# Patient Record
Sex: MALE | Race: WHITE
[De-identification: names, ages, dates, MRNs, and addresses within clinical notes are randomized per-mention and may not be internally consistent; named-entity substitution may affect disease eponyms.]

---

## 2018-05-14 ENCOUNTER — HOSPITAL ENCOUNTER (OUTPATIENT)
Dept: HOSPITAL 33 - SDC | Age: 48
Discharge: HOME | End: 2018-05-14
Attending: SURGERY
Payer: COMMERCIAL

## 2018-05-14 VITALS — SYSTOLIC BLOOD PRESSURE: 128 MMHG | HEART RATE: 76 BPM | DIASTOLIC BLOOD PRESSURE: 70 MMHG

## 2018-05-14 VITALS — OXYGEN SATURATION: 97 %

## 2018-05-14 DIAGNOSIS — K63.9: ICD-10-CM

## 2018-05-14 DIAGNOSIS — Q43.8: ICD-10-CM

## 2018-05-14 DIAGNOSIS — Z12.11: Primary | ICD-10-CM

## 2018-05-14 DIAGNOSIS — K63.5: ICD-10-CM

## 2018-05-14 PROCEDURE — 88305 TISSUE EXAM BY PATHOLOGIST: CPT

## 2018-05-14 NOTE — HP
DATE OF SURGERY:  05/14/2018



HISTORY OF PRESENT ILLNESS:  The patient is a 47 year-old going to be 48 this year with 
history of diverticulitis episode a few months ago and no prior colonoscopy. No bloody 
stools. No change in bowel movements. No family history of colon cancer. He is in need of 
screening colonoscopy as he turns 48 this year. 



PAST MEDICAL HISTORY:  Hypertension/



PAST SURGICAL HISTORY:  None.



MEDICATIONS:  He takes blood pressure pill the name of which he did not know at the time 
of the office visit. 



ALLERGIES:  NKDA.

 

FAMILY HISTORY:  Negative for colon cancer. 



SOCIAL HISTORY:  He denies smoking currently. 



REVIEW OF SYSTEMS:  Twelve systems reviewed per admission assessment. No chest pain or 
palpitations other systems negative or noncontributory as above and per preadmission 
questionnaire. 



PHYSICAL EXAMINATION:  

GENERAL:  No acute distress.

HEENT: Sclerae nonicteric.

NECK:  No JVD.

CHEST: Equal excursion, nonlabored breathing. 

CVS:  Regular rate and rhythm. 

ABDOMEN:  Soft. No peritoneal signs.   

EXTREMITIES:  No significant edema.

NEURO:  Alert, oriented, moving extremities symmetrically. No gross motor deficits noted.

RECTAL: Deferred timed to endoscopy exam. 



IMPRESSION:  A gentleman with history of diverticular episode a few months ago. He is 
going to be 48 this year. I feel he would benefit from screening colonoscopy. The risks 
and benefits explained in detail including but not limited to bleeding or infection, small 
risk of bowel injury or perforation possibly requiring open procedure, small risk of 
missed or nondiagnosis or incomplete exam possibly requiring barium enema, other studies 
or procedures or procedures, general risk of anesthesia or sedation but not limited to.  
He understands and agrees to the planned procedure and will proceed with outpatient 
screening colonoscopy.

## 2018-05-15 NOTE — OP
SURGERY DATE/TIME:   05/14/2018  1045



PREOPERATIVE DIAGNOSIS:      Need screening colonoscopy, age 48 this year as well as 
history of some diverticular episode in the past. 



POSTOPERATIVE DIAGNOSES:   

1) Tortuous colon. 

2) Small sigmoid colon polyp. 

3) Small raised lesion versus hyperplastic lesion transverse colon and rectum. 

4) Mild diverticulosis.

5) Small internal and external hemorrhoids. 



PROCEDURES:  

1) Colonoscopy to cecum. 

2) Hot snare polypectomy of small sigmoid colon polyp. 

2) Hot biopsy removal of small early polyp versus raised lesion or hyperplastic lesion 
rectum, small raised lesion transverse colon removed with hot biopsy forceps.



SURGEON:       Dr. Valeriy Love.



ANESTHESIA:   MAC.



ESTIMATED BLOOD LOSS:    Minimal.  



INDICATIONS:  As noted above. Risks and benefits explained in detail but not limited to 
and consent obtained. 



DESCRIPTION OF PROCEDURE AND FINDINGS: The patient is taken to the operating room. MAC 
anesthesia introduced. After official time out and no disagreement with planned procedure, 
digital rectal exam did not reveal any rectal masses. Video colonoscope inserted and 
passed up the tortuous sigmoid, descending, transverse colon, descending colon to the 
cecum. It is quite a tortuous colon requiring positioning on his back. The scope was able 
to reach the cecum. Appendiceal orifice and valve well visualized. Prep overall was 
somewhat poor with some areas of liquidy and semisolid stool limiting exam this is suction 
irrigated as well as possible but did limit the exam for very small lesions. On withdrawal 
of the scope there were no signs of any large polyps, masses or obstructing lesions. There 
is a small vague raised area in the transverse colon whether this is just some hyperplasia 
of mucosa or not it was removed with hot biopsy forceps with brief bursts of cautery. Good 
hemostasis was noted. Otherwise the scope pulled around to the left colon. It had some 
mild diverticulosis. The sigmoid colon had a 3 to 3.5 mm polyp that was removed with hot 
snare polypectomy with brief bursts of cautery. Good hemostasis was noted. Otherwise small 
early polyp versus hyperplastic lesion in the rectum was removed with hot biopsy forceps 
with brief bursts of cautery. There had been some brief ooze at this biopsy site that 
lasted for a few seconds but appeared to have good hemostasis. Otherwise he had some small 
internal and external hemorrhoids. There was no signs of any other large polyps, masses or 
obstructing lesion. Await final path results to determine the timing of follow up 
endoscopy. Findings discussed with the family out in the waiting area.

## 2022-10-17 ENCOUNTER — HOSPITAL ENCOUNTER (OUTPATIENT)
Dept: HOSPITAL 33 - SDC | Age: 52
Discharge: HOME | End: 2022-10-17
Attending: SURGERY
Payer: COMMERCIAL

## 2022-10-17 VITALS — SYSTOLIC BLOOD PRESSURE: 134 MMHG | DIASTOLIC BLOOD PRESSURE: 76 MMHG | HEART RATE: 73 BPM | OXYGEN SATURATION: 96 %

## 2022-10-17 DIAGNOSIS — Z09: Primary | ICD-10-CM

## 2022-10-17 DIAGNOSIS — D12.5: ICD-10-CM

## 2022-10-17 DIAGNOSIS — K57.30: ICD-10-CM

## 2022-10-17 DIAGNOSIS — Z86.010: ICD-10-CM

## 2022-10-17 DIAGNOSIS — K64.8: ICD-10-CM

## 2022-10-17 NOTE — HP
DATE OF SURGERY:  10/17/2022 



HISTORY OF PRESENT ILLNESS:  The patient is a 51-year-old with history of polyps, no 
bloody stools, no change in bowel movements. Family history negative for colon cancer. 
History of polyps is in need of follow up screening colonoscopy. 



PAST MEDICAL HISTORY:  Hypertension.



PAST SURGICAL HISTORY:  No prior surgery. He has had colonoscopy in the past. 



MEDICATIONS:   He takes some blood pressure medication name of which he did not know.



ALLERGIES:  NKDA.

  

FAMILY HISTORY:  Negative for colon cancer. 



SOCIAL HISTORY:  No smoking. 



REVIEW OF SYSTEMS:  Fourteen systems reviewed. No chest pain or palpitations. Other 
systems negative or noncontributory as above and per preadmission questionnaire. 



PHYSICAL EXAMINATION:  

GENERAL:  No acute distress.

HEENT: Sclerae nonicteric.

NECK:  No JVD.

CHEST: Equal excursion, nonlabored breathing. 

CVS:  Regular rate and rhythm. 

ABDOMEN:  Soft.  

EXTREMITIES:  No significant edema.

NEURO:  Alert, oriented, moving extremities symmetrically.  

RECTAL: Deferred timed to endoscopy exam.

PSYCH: Appropriate mood and affect.  



IMPRESSION:  History of polyps, need follow up screening colonoscopy and I feel he is a 
candidate. He is shown the risk sheet explained the procedure in detail including bleeding 
or infection, risk of bowel injury or perforation possibly requiring further procedure, 
risk of missed or nondiagnosis or incomplete exam possibly requiring barium enema, other 
studies or procedures, general risk of anesthesia or sedation, risk of bowel prep or 
sedation but not limited to, consent obtained. Will proceed with outpatient follow up 
screening colonoscopy.

## 2022-10-17 NOTE — OP
SURGERY DATE/TIME:  10/17/2022  1132



PREOPERATIVE DIAGNOSIS:      History of polyps in the past, need follow up screening 
colonoscopy. 



POSTOPERATIVE DIAGNOSES: 

1) Small sigmoid colon polyps. 

2) Pancolonic diverticulosis. 

3) Fair bowel prep. 

4) ASA Class II. 

5) Withdrawal time approximately 6 minutes and 30 seconds. 



PROCEDURES:

1) Colonoscopy to cecum. 

2) Hot biopsy polypectomy small early sigmoid colon polyp versus hyperplastic lesion, path 
pending. 



SURGEON:       Dr. Valeriy Love.



ANESTHESIA:    MAC.



ESTIMATED BLOOD LOSS:    Minimal.  



INDICATIONS:  As noted above. Risks and benefits explained in detail but not limited to 
and consent obtained. 



DESCRIPTION OF PROCEDURE AND FINDINGS:  The patient is taken to the endoscopy room. MAC 
anesthesia induced. After official time out and no disagreement with planned procedure, 
digital rectal exam did not reveal any rectal masses. He had some minimal internal 
hemorrhoids. Video colonoscope inserted and passed up through the tortuous slightly 
tortuous descending, transverse and ascending colon. With external pressure the scope was 
passed to the cecum. Appendiceal orifice and valve well visualized. Prep overall is fair, 
a little bit of liquidy semisolid stool slightly limiting the exam for very small lesions 
this is suctioned irrigated as clear as possible. The scope is carefully withdrawn over 
the next 6 1/2 minutes at least suctioning and irrigating out liquidy stool as well as 
possible. He did have diverticula throughout the entire colon. They were a little more 
concentrated in the left colon. The scope is carefully pulled back. There were no signs of 
any large polyps, masses or any other obstructing lesions. There was a small 2 to 2.5 mm 
polyp versus hyperplastic lesion sigmoid colon removed with hot biopsy forceps. Good 
hemostasis noted. Otherwise he had diverticulosis. No signs of any other large polyps, 
masses or obstructing lesions. The scope is withdrawn. The patient tolerated the procedure 
well. I will see if he has family to discuss the findings with.

## 2025-03-14 ENCOUNTER — HOSPITAL ENCOUNTER (OUTPATIENT)
Dept: HOSPITAL 33 - SDC | Age: 55
Discharge: HOME | End: 2025-03-14
Attending: FAMILY MEDICINE
Payer: COMMERCIAL

## 2025-03-14 VITALS — TEMPERATURE: 97.6 F | HEART RATE: 67 BPM | DIASTOLIC BLOOD PRESSURE: 71 MMHG | SYSTOLIC BLOOD PRESSURE: 115 MMHG

## 2025-03-14 VITALS — OXYGEN SATURATION: 95 %

## 2025-03-14 VITALS — RESPIRATION RATE: 16 BRPM

## 2025-03-14 DIAGNOSIS — K29.70: Primary | ICD-10-CM

## 2025-03-14 DIAGNOSIS — R10.9: ICD-10-CM

## 2025-03-14 LAB
ANION GAP SERPL CALC-SCNC: 16 MEQ/L (ref 5–15)
BUN SERPL-MCNC: 16 MG/DL (ref 9–20)
CALCIUM SPEC-MCNC: 8.8 MG/DL (ref 8.4–10.2)
CHLORIDE SERPL-SCNC: 103 MMOL/L (ref 98–107)
CO2 SERPL-SCNC: 24 MMOL/L (ref 22–30)
CREAT SERPL-MCNC: 0.72 MG/DL (ref 0.66–1.25)
GFR SERPLBLD BASED ON 1.73 SQ M-ARVRAT: 107.2 ML/MIN
GLUCOSE SERPL-MCNC: 107 MG/DL (ref 74–106)
POTASSIUM SERPLBLD-SCNC: 4.1 MMOL/L (ref 3.5–5.1)
SODIUM SERPL-SCNC: 139 MMOL/L (ref 135–145)

## 2025-03-14 PROCEDURE — 93005 ELECTROCARDIOGRAM TRACING: CPT

## 2025-03-14 PROCEDURE — 80048 BASIC METABOLIC PNL TOTAL CA: CPT

## 2025-03-14 PROCEDURE — 36415 COLL VENOUS BLD VENIPUNCTURE: CPT

## 2025-03-14 NOTE — OP
SURGERY DATE/TIME:  03/14/2025   [id number]



PREOPERATIVE DIAGNOSIS:  Abdominal pain for 2 months.



POSTOPERATIVE DIAGNOSIS:  Mild gastritis.



PROCEDURE:  Esophagogastroduodenoscopy with cold forceps biopsy.



SURGEON:  Mendoza Ocampo MD  



ANESTHESIA:  Medications were given by the anesthesia department. 



INDICATIONS:  The patient is a 54-year-old white male patient with complaints of abdominal 
pain for the past 2 months.  He reported in the last couple of days things have actually 
gotten somewhat better.  He was placed on some Protonix.  The patient now complains of his 
pain mostly in his left lower quadrant.  He has had no change in his bowels.  He did have 
previous colonoscopy that was essentially normal not too long ago.  The patient was felt 
to need to have endoscopic evaluation.  He was appraised of the risks of the procedure 
including risk of perforation, phlebitis, untoward reaction to medication, bleeding, and 
missed lesions.  The patient verbalized his understanding and desire to have procedure 
performed.  



DESCRIPTION OF PROCEDURE AND FINDINGS:  The patient was given medication by the anesthesia 
department.  He had continuous pulse oximetry, ECG monitoring, and intermittent blood 
pressure monitoring during the examination.  He was placed in the left lateral decubitus 
position.  A bite block was placed, and a flexible Olympus gastroscope was used to 
intubate the oropharynx.  A view of the larynx was obtained and was normal.  The scope was 
easily introduced into the esophagus, which appeared to be essentially normal throughout 
its length.  The stomach was entered where normal gastric rugal folds were seen, and each 
distended nicely with insufflation of air.  The scope was passed along the greater 
curvature of the stomach to the antrum.  The pylorus was encountered and intubated.  The 
duodenum was inspected and found to be normal.  Scope was withdrawn towards the stomach 
again.  A retroflexed view was obtained of the lesser curvature, fundus, and cardia 
regions of the stomach, and these appeared to be essentially normal.  The scope was then 
redirected towards the gastric antrum where biopsies were obtained to rule out the 
presence of Helicobacter pylori-type organisms.  The scope was then removed from the 
patient, who tolerated the procedure well and was sent back to outpatient recovery in good 
condition.